# Patient Record
Sex: FEMALE | Race: WHITE | NOT HISPANIC OR LATINO | Employment: FULL TIME | URBAN - NONMETROPOLITAN AREA
[De-identification: names, ages, dates, MRNs, and addresses within clinical notes are randomized per-mention and may not be internally consistent; named-entity substitution may affect disease eponyms.]

---

## 2019-10-09 ENCOUNTER — OFFICE VISIT (OUTPATIENT)
Dept: OBSTETRICS AND GYNECOLOGY | Facility: CLINIC | Age: 39
End: 2019-10-09

## 2019-10-09 VITALS
DIASTOLIC BLOOD PRESSURE: 76 MMHG | WEIGHT: 136 LBS | HEIGHT: 66 IN | BODY MASS INDEX: 21.86 KG/M2 | SYSTOLIC BLOOD PRESSURE: 118 MMHG

## 2019-10-09 DIAGNOSIS — N94.10 FEMALE DYSPAREUNIA: ICD-10-CM

## 2019-10-09 DIAGNOSIS — R10.2 PELVIC PAIN: ICD-10-CM

## 2019-10-09 DIAGNOSIS — R53.83 FATIGUE, UNSPECIFIED TYPE: ICD-10-CM

## 2019-10-09 DIAGNOSIS — Z01.419 WELL WOMAN EXAM WITH ROUTINE GYNECOLOGICAL EXAM: Primary | ICD-10-CM

## 2019-10-09 DIAGNOSIS — Z12.39 ENCOUNTER FOR SCREENING FOR MALIGNANT NEOPLASM OF BREAST: ICD-10-CM

## 2019-10-09 DIAGNOSIS — R61 NIGHT SWEATS: ICD-10-CM

## 2019-10-09 PROCEDURE — 99385 PREV VISIT NEW AGE 18-39: CPT | Performed by: NURSE PRACTITIONER

## 2019-10-09 RX ORDER — CETIRIZINE HCL/PSEUDOEPHEDRINE 5 MG-120MG
TABLET, EXTENDED RELEASE 12 HR ORAL
Refills: 1 | COMMUNITY
Start: 2019-08-31 | End: 2022-07-29

## 2019-10-09 RX ORDER — MONTELUKAST SODIUM 10 MG/1
10 TABLET ORAL EVERY EVENING
Refills: 0 | COMMUNITY
Start: 2019-08-05 | End: 2022-07-29

## 2019-10-09 RX ORDER — ALBUTEROL SULFATE 90 UG/1
2 AEROSOL, METERED RESPIRATORY (INHALATION) EVERY 4 HOURS PRN
COMMUNITY

## 2019-10-09 RX ORDER — CETIRIZINE HYDROCHLORIDE 10 MG/1
10 TABLET ORAL EVERY EVENING
Refills: 1 | COMMUNITY
Start: 2019-08-08

## 2019-10-09 NOTE — PROGRESS NOTES
"    Subjective   Bree Garrett is a 39 y.o. female  YOB: 1980        Chief Complaint   Patient presents with   • Gynecologic Exam     New patient here today for yearly exam. Last exam was done in 2015, pap was normal. Patient has had a TLH w/ bilateral salpingectomy due to Adenomyosis. Patient has c/o pain after intercourse in her lower back near her tailbone. Patient states the pain wraps around to her stomach. Patient also states she feels like \"her hormones are off\". Patietn c/o night sweats, acne and vaginal dryness.        Gynecologic Exam   The patient's primary symptoms include pelvic pain. Pertinent negatives include no abdominal pain, back pain, constipation, diarrhea, dysuria, fever, frequency, hematuria, nausea, rash, sore throat, urgency or vomiting.       The following portions of the patient's history were reviewed and updated as appropriate: allergies, current medications, past family history, past medical history, past social history, past surgical history and problem list.    No Known Allergies    Past Medical History:   Diagnosis Date   • Allergies    • Asthma    • Endometriosis        Family History   Problem Relation Age of Onset   • Breast cancer Neg Hx    • Ovarian cancer Neg Hx    • Colon cancer Neg Hx        Social History     Socioeconomic History   • Marital status:      Spouse name: Not on file   • Number of children: Not on file   • Years of education: Not on file   • Highest education level: Not on file   Tobacco Use   • Smoking status: Never Smoker   • Smokeless tobacco: Never Used   Substance and Sexual Activity   • Alcohol use: Defer   • Drug use: No   • Sexual activity: Yes     Birth control/protection: Surgical         Current Outpatient Medications:   •  albuterol sulfate  (90 Base) MCG/ACT inhaler, Inhale 2 puffs Every 4 (Four) Hours As Needed for Wheezing., Disp: , Rfl:   •  cetirizine (zyrTEC) 10 MG tablet, Take 10 mg by mouth Every Evening., Disp: " , Rfl: 1  •  montelukast (SINGULAIR) 10 MG tablet, Take 10 mg by mouth Every Evening., Disp: , Rfl: 0  •  ZYRTEC-D ALLERGY & CONGESTION 5-120 MG per 12 hr tablet, TAKE 1 TABLET ONCE A DAY IN THE AM ORALLY, Disp: , Rfl: 1    No LMP recorded. Patient has had a hysterectomy.    Sexual History:         Could not be calculated    Past Surgical History:   Procedure Laterality Date   • BREAST AUGMENTATION     • DILATATION AND CURETTAGE     • ENDOMETRIAL ABLATION      x 2   • ENDOSCOPY     • HYSTERECTOMY  12/07/2015    [facility] w/ bilateral salpingectomy due to Adenomyosis.    • SALPINGECTOMY Bilateral        Review of Systems   Constitutional: Negative for activity change, appetite change, fatigue, fever, unexpected weight gain and unexpected weight loss.   HENT: Negative for congestion, ear pain, hearing loss, nosebleeds, rhinorrhea, sore throat, tinnitus and trouble swallowing.    Eyes: Negative for blurred vision, pain, discharge, itching and visual disturbance.   Respiratory: Negative for apnea, chest tightness, shortness of breath and wheezing.    Cardiovascular: Negative for chest pain and leg swelling.   Gastrointestinal: Negative for abdominal pain, blood in stool, constipation, diarrhea, nausea, vomiting and GERD.   Endocrine: Negative for heat intolerance, polydipsia and polyuria.   Genitourinary: Positive for dyspareunia and pelvic pain. Negative for breast lump, decreased libido, difficulty urinating, dysuria, frequency, genital sores, hematuria, menstrual problem, urgency, urinary incontinence and vaginal pain.   Musculoskeletal: Negative for arthralgias, back pain, joint swelling and myalgias.   Skin: Negative for color change, rash and skin lesions.   Allergic/Immunologic: Negative for environmental allergies, food allergies and immunocompromised state.   Neurological: Negative for dizziness, tremors, seizures, syncope, facial asymmetry, numbness and headache.   Hematological: Negative for adenopathy. Does not  bruise/bleed easily.   Psychiatric/Behavioral: Negative for agitation, hallucinations, sleep disturbance, suicidal ideas and depressed mood. The patient is not nervous/anxious.        Objective   Physical Exam   Constitutional: She is oriented to person, place, and time. She appears well-developed and well-nourished. No distress.   HENT:   Head: Normocephalic.   Right Ear: External ear normal.   Left Ear: External ear normal.   Nose: Nose normal.   Mouth/Throat: Oropharynx is clear and moist.   Eyes: Conjunctivae are normal. Right eye exhibits no discharge. Left eye exhibits no discharge. No scleral icterus.   Neck: Normal range of motion. Neck supple. Carotid bruit is not present. No tracheal deviation present. No thyromegaly present.   Cardiovascular: Normal rate, regular rhythm, normal heart sounds and intact distal pulses.   No murmur heard.  Pulmonary/Chest: Effort normal and breath sounds normal. No respiratory distress. She has no wheezes. Right breast exhibits no inverted nipple, no mass, no nipple discharge, no skin change and no tenderness. Left breast exhibits no inverted nipple, no mass, no nipple discharge, no skin change and no tenderness. Breasts are symmetrical. There is no breast swelling.   Abdominal: Soft. She exhibits no distension and no mass. There is no tenderness. There is no guarding. No hernia. Hernia confirmed negative in the right inguinal area and confirmed negative in the left inguinal area.   Genitourinary: Rectum normal, vagina normal and uterus normal. Rectal exam shows no mass. No breast tenderness, discharge or bleeding. Pelvic exam was performed with patient supine. There is no rash, tenderness, lesion or injury on the right labia. There is no rash, tenderness, lesion or injury on the left labia. Uterus is not enlarged, not fixed and not tender. Cervix exhibits no motion tenderness, no discharge and no friability. Right adnexum displays no mass, no tenderness and no fullness. Left  "adnexum displays no mass, no tenderness and no fullness. No erythema, tenderness or bleeding in the vagina. No foreign body in the vagina. No signs of injury around the vagina. No vaginal discharge found.   Genitourinary Comments:   BSU normal  Urethral meatus  Normal  Perineum  Normal   Musculoskeletal: Normal range of motion. She exhibits no edema or tenderness.   Lymphadenopathy:        Head (right side): No submental, no submandibular, no tonsillar, no preauricular, no posterior auricular and no occipital adenopathy present.        Head (left side): No submental, no submandibular, no tonsillar, no preauricular, no posterior auricular and no occipital adenopathy present.     She has no cervical adenopathy.        Right cervical: No superficial cervical, no deep cervical and no posterior cervical adenopathy present.       Left cervical: No superficial cervical, no deep cervical and no posterior cervical adenopathy present.     She has no axillary adenopathy.        Right: No inguinal adenopathy present.        Left: No inguinal adenopathy present.   Neurological: She is alert and oriented to person, place, and time. Coordination normal.   Skin: Skin is warm and dry. No bruising and no rash noted. She is not diaphoretic. No erythema.   Psychiatric: She has a normal mood and affect. Her behavior is normal. Judgment and thought content normal.   Nursing note and vitals reviewed.        Vitals:    10/09/19 1108   BP: 118/76   Weight: 61.7 kg (136 lb)   Height: 167.6 cm (66\")       Bree was seen today for gynecologic exam.    Diagnoses and all orders for this visit:    Well woman exam with routine gynecological exam  Comments:  Well woman exam.  ThinPrep Pap smear done.  Orders:  -     Vitamin D 25 Hydroxy  -     T4, Free  -     Hemoglobin A1c  -     CBC & Differential  -     Comprehensive Metabolic Panel  -     Lipid Panel With LDL / HDL Ratio  -     TSH    Encounter for screening for malignant neoplasm of " breast  Comments:  Baseline mammogram ordered.  Orders:  -     Mammo Screening Digital Tomosynthesis Bilateral With CAD; Future    Female dyspareunia  Comments:  Patient reports pain after intercourse in her low back and near her tailbone that wraps around to her stomach.    Fatigue, unspecified type  Comments:  Yearly lab panel done -pending results.  Orders:  -     Vitamin D 25 Hydroxy  -     T4, Free  -     Hemoglobin A1c  -     CBC & Differential  -     Comprehensive Metabolic Panel  -     Lipid Panel With LDL / HDL Ratio  -     TSH  -     Follicle Stimulating Hormone  -     Luteinizing Hormone  -     Testosterone  -     DHEA-Sulfate    Night sweats  Comments:  Patient reports frequent night sweats.  Lab work done [id number] pending results.  Orders:  -     Follicle Stimulating Hormone  -     Luteinizing Hormone  -     Testosterone  -     DHEA-Sulfate    Pelvic pain  Comments:  Patient reports pain after intercourse and generalized pelvic pain.  Bimanual exam unremarkable.  Pelvic ultrasound ordered.  History of endometriosis.          Patient's Body mass index is 21.95 kg/m². BMI is within normal parameters. No follow-up required..             Non-Smoker    MyChart Instructions Given

## 2019-10-10 LAB
25(OH)D3+25(OH)D2 SERPL-MCNC: 33.2 NG/ML (ref 30–100)
ALBUMIN SERPL-MCNC: 4.8 G/DL (ref 3.5–5.2)
ALBUMIN/GLOB SERPL: 1.9 G/DL
ALP SERPL-CCNC: 60 U/L (ref 39–117)
ALT SERPL-CCNC: 12 U/L (ref 1–33)
AST SERPL-CCNC: 17 U/L (ref 1–32)
BASOPHILS # BLD AUTO: 0.03 10*3/MM3 (ref 0–0.2)
BASOPHILS NFR BLD AUTO: 0.4 % (ref 0–1.5)
BILIRUB SERPL-MCNC: 0.4 MG/DL (ref 0.2–1.2)
BUN SERPL-MCNC: 11 MG/DL (ref 6–20)
BUN/CREAT SERPL: 16.9 (ref 7–25)
CALCIUM SERPL-MCNC: 8.3 MG/DL (ref 8.6–10.5)
CHLORIDE SERPL-SCNC: 98 MMOL/L (ref 98–107)
CHOLEST SERPL-MCNC: 179 MG/DL (ref 0–200)
CO2 SERPL-SCNC: 26.4 MMOL/L (ref 22–29)
CREAT SERPL-MCNC: 0.65 MG/DL (ref 0.57–1)
DHEA-S SERPL-MCNC: 142.9 UG/DL (ref 57.3–279.2)
EOSINOPHIL # BLD AUTO: 0.09 10*3/MM3 (ref 0–0.4)
EOSINOPHIL NFR BLD AUTO: 1.3 % (ref 0.3–6.2)
ERYTHROCYTE [DISTWIDTH] IN BLOOD BY AUTOMATED COUNT: 12.2 % (ref 12.3–15.4)
FSH SERPL-ACNC: 4.5 MIU/ML
GLOBULIN SER CALC-MCNC: 2.5 GM/DL
GLUCOSE SERPL-MCNC: 84 MG/DL (ref 65–99)
HBA1C MFR BLD: 5.1 % (ref 4.8–5.6)
HCT VFR BLD AUTO: 40.6 % (ref 34–46.6)
HDLC SERPL-MCNC: 71 MG/DL (ref 40–60)
HGB BLD-MCNC: 13.7 G/DL (ref 12–15.9)
IMM GRANULOCYTES # BLD AUTO: 0.01 10*3/MM3 (ref 0–0.05)
IMM GRANULOCYTES NFR BLD AUTO: 0.1 % (ref 0–0.5)
LDLC SERPL CALC-MCNC: 92 MG/DL (ref 0–100)
LDLC/HDLC SERPL: 1.3 {RATIO}
LH SERPL-ACNC: 5.4 MIU/ML
LYMPHOCYTES # BLD AUTO: 1.38 10*3/MM3 (ref 0.7–3.1)
LYMPHOCYTES NFR BLD AUTO: 19.6 % (ref 19.6–45.3)
MCH RBC QN AUTO: 33.3 PG (ref 26.6–33)
MCHC RBC AUTO-ENTMCNC: 33.7 G/DL (ref 31.5–35.7)
MCV RBC AUTO: 98.8 FL (ref 79–97)
MONOCYTES # BLD AUTO: 0.67 10*3/MM3 (ref 0.1–0.9)
MONOCYTES NFR BLD AUTO: 9.5 % (ref 5–12)
NEUTROPHILS # BLD AUTO: 4.86 10*3/MM3 (ref 1.7–7)
NEUTROPHILS NFR BLD AUTO: 69.1 % (ref 42.7–76)
NRBC BLD AUTO-RTO: 0 /100 WBC (ref 0–0.2)
PLATELET # BLD AUTO: 290 10*3/MM3 (ref 140–450)
POTASSIUM SERPL-SCNC: 4.2 MMOL/L (ref 3.5–5.2)
PROT SERPL-MCNC: 7.3 G/DL (ref 6–8.5)
RBC # BLD AUTO: 4.11 10*6/MM3 (ref 3.77–5.28)
SODIUM SERPL-SCNC: 139 MMOL/L (ref 136–145)
T4 FREE SERPL-MCNC: 1.32 NG/DL (ref 0.93–1.7)
TESTOST SERPL-MCNC: 13 NG/DL (ref 8–48)
TRIGL SERPL-MCNC: 79 MG/DL (ref 0–150)
TSH SERPL DL<=0.005 MIU/L-ACNC: 1.34 UIU/ML (ref 0.27–4.2)
VLDLC SERPL CALC-MCNC: 15.8 MG/DL
WBC # BLD AUTO: 7.04 10*3/MM3 (ref 3.4–10.8)

## 2019-11-05 ENCOUNTER — OFFICE VISIT (OUTPATIENT)
Dept: OBSTETRICS AND GYNECOLOGY | Facility: CLINIC | Age: 39
End: 2019-11-05

## 2019-11-05 ENCOUNTER — PROCEDURE VISIT (OUTPATIENT)
Dept: OBSTETRICS AND GYNECOLOGY | Facility: CLINIC | Age: 39
End: 2019-11-05

## 2019-11-05 VITALS
HEIGHT: 66 IN | DIASTOLIC BLOOD PRESSURE: 66 MMHG | BODY MASS INDEX: 22.18 KG/M2 | SYSTOLIC BLOOD PRESSURE: 122 MMHG | WEIGHT: 138 LBS

## 2019-11-05 DIAGNOSIS — R10.2 PELVIC PAIN: Primary | ICD-10-CM

## 2019-11-05 DIAGNOSIS — N94.10 DYSPAREUNIA IN FEMALE: ICD-10-CM

## 2019-11-05 PROCEDURE — 76830 TRANSVAGINAL US NON-OB: CPT | Performed by: OBSTETRICS & GYNECOLOGY

## 2019-11-05 PROCEDURE — 99213 OFFICE O/P EST LOW 20 MIN: CPT | Performed by: NURSE PRACTITIONER

## 2019-11-05 NOTE — PROGRESS NOTES
"Subjective     Bree Garrett is a 39 y.o. female    Patient is here today for follow-up of pelvic ultrasound for pelvic pain and pain with intercourse.      Pelvic Pain   The patient's primary symptoms include pelvic pain. The patient's pertinent negatives include no vaginal bleeding or vaginal discharge. The pain is mild. The problem affects the left side. She is not pregnant. Associated symptoms include painful intercourse. Pertinent negatives include no abdominal pain, anorexia, back pain, chills, constipation, diarrhea, discolored urine, dysuria, fever, flank pain, frequency, headaches, hematuria, joint pain, joint swelling, nausea, rash, sore throat, urgency or vomiting. The symptoms are aggravated by intercourse. She has tried nothing for the symptoms. She is sexually active. She uses hysterectomy for contraception.         /66   Ht 167.6 cm (66\")   Wt 62.6 kg (138 lb)   Breastfeeding? No   BMI 22.27 kg/m²     Outpatient Encounter Medications as of 11/5/2019   Medication Sig Dispense Refill   • albuterol sulfate  (90 Base) MCG/ACT inhaler Inhale 2 puffs Every 4 (Four) Hours As Needed for Wheezing.     • cetirizine (zyrTEC) 10 MG tablet Take 10 mg by mouth Every Evening.  1   • montelukast (SINGULAIR) 10 MG tablet Take 10 mg by mouth Every Evening.  0   • ZYRTEC-D ALLERGY & CONGESTION 5-120 MG per 12 hr tablet TAKE 1 TABLET ONCE A DAY IN THE AM ORALLY  1     No facility-administered encounter medications on file as of 11/5/2019.        Surgical History  Past Surgical History:   Procedure Laterality Date   • BREAST AUGMENTATION     • DILATATION AND CURETTAGE     • ENDOMETRIAL ABLATION      x 2   • ENDOSCOPY     • HYSTERECTOMY  12/07/2015    [facility] w/ bilateral salpingectomy due to Adenomyosis.    • SALPINGECTOMY Bilateral        Family History  Family History   Problem Relation Age of Onset   • Heart disease Father    • Lung cancer Mother    • No Known Problems Brother    • Heart disease Paternal " Grandmother    • Heart disease Maternal Grandmother    • Breast cancer Neg Hx    • Ovarian cancer Neg Hx    • Colon cancer Neg Hx        The following portions of the patient's history were reviewed and updated as appropriate: allergies, current medications, past family history, past medical history, past social history, past surgical history and problem list.    Review of Systems   Constitutional: Negative for activity change, appetite change, chills, diaphoresis, fatigue, fever, unexpected weight gain and unexpected weight loss.   HENT: Negative for congestion, dental problem, drooling, ear discharge, ear pain, facial swelling, hearing loss, mouth sores, nosebleeds, postnasal drip, rhinorrhea, sinus pressure, sneezing, sore throat, swollen glands, tinnitus, trouble swallowing and voice change.    Eyes: Negative for blurred vision, double vision, photophobia, pain, discharge, redness, itching and visual disturbance.   Respiratory: Negative for apnea, cough, choking, chest tightness, shortness of breath, wheezing and stridor.    Cardiovascular: Negative for chest pain, palpitations and leg swelling.   Gastrointestinal: Negative for abdominal distention, abdominal pain, anal bleeding, anorexia, blood in stool, constipation, diarrhea, nausea, rectal pain, vomiting, GERD and indigestion.   Endocrine: Negative for cold intolerance, heat intolerance, polydipsia, polyphagia and polyuria.   Genitourinary: Positive for dyspareunia and pelvic pain. Negative for amenorrhea, breast discharge, breast lump, breast pain, decreased libido, decreased urine volume, difficulty urinating, dysuria, flank pain, frequency, genital sores, hematuria, menstrual problem, pelvic pressure, urgency, urinary incontinence, vaginal bleeding, vaginal discharge and vaginal pain.   Musculoskeletal: Negative for arthralgias, back pain, gait problem, joint pain, joint swelling, myalgias, neck pain, neck stiffness and bursitis.   Skin: Negative for  color change, dry skin and rash.   Allergic/Immunologic: Negative for environmental allergies, food allergies and immunocompromised state.   Neurological: Negative for dizziness, tremors, seizures, syncope, facial asymmetry, speech difficulty, weakness, light-headedness, numbness, headache, memory problem and confusion.   Hematological: Negative for adenopathy. Does not bruise/bleed easily.   Psychiatric/Behavioral: Negative for agitation, behavioral problems, decreased concentration, dysphoric mood, hallucinations, self-injury, sleep disturbance, suicidal ideas, negative for hyperactivity, depressed mood and stress. The patient is not nervous/anxious.        Objective   Physical Exam   Constitutional: She is oriented to person, place, and time. She appears well-developed and well-nourished.   HENT:   Head: Normocephalic and atraumatic.   Eyes: Conjunctivae are normal. Right eye exhibits no discharge. Left eye exhibits no discharge.   Neck: Normal range of motion. Neck supple. No thyromegaly present.   Cardiovascular: Normal rate, regular rhythm and normal heart sounds.   Pulmonary/Chest: Effort normal and breath sounds normal.   Neurological: She is alert and oriented to person, place, and time.   Skin: Skin is warm and dry.   Psychiatric: She has a normal mood and affect. Her behavior is normal. Judgment and thought content normal.   Nursing note and vitals reviewed.      Assessment/Plan   Bree was seen today for pelvic pain.    Diagnoses and all orders for this visit:    Pelvic pain  Comments:  She has had some left lower quadrant pain radiating around to her back but only at times.  Pelvic ultrasound today shows a very small hemorrhagic follicle.  It appears to be resolving.    Dyspareunia in female  Comments:  Pain with intercourse does not occur every time.  She is offered physical therapy for her pelvic floor and will call if she so desires.  She denies any vaginal discharge or bleeding.         Patient's  Body mass index is 22.27 kg/m². BMI is within normal parameters. No follow-up required..      Janae Angeles, APRN  11/5/2019

## 2019-11-05 NOTE — PATIENT INSTRUCTIONS

## 2020-02-11 ENCOUNTER — TRANSCRIBE ORDERS (OUTPATIENT)
Dept: ADMINISTRATIVE | Facility: HOSPITAL | Age: 40
End: 2020-02-11

## 2020-02-11 DIAGNOSIS — Z12.31 OTHER SCREENING MAMMOGRAM: Primary | ICD-10-CM

## 2020-02-21 ENCOUNTER — HOSPITAL ENCOUNTER (OUTPATIENT)
Dept: MAMMOGRAPHY | Facility: HOSPITAL | Age: 40
Discharge: HOME | End: 2020-02-21
Admitting: FAMILY MEDICINE

## 2020-02-21 DIAGNOSIS — Z12.31 OTHER SCREENING MAMMOGRAM: ICD-10-CM

## 2020-02-21 PROCEDURE — 77063 BREAST TOMOSYNTHESIS BI: CPT

## 2020-02-21 PROCEDURE — 77067 SCR MAMMO BI INCL CAD: CPT

## 2022-07-25 ENCOUNTER — OUTSIDE FACILITY SERVICE (OUTPATIENT)
Dept: CARDIOLOGY | Facility: CLINIC | Age: 42
End: 2022-07-25

## 2022-07-25 DIAGNOSIS — R00.1 BRADYCARDIA: ICD-10-CM

## 2022-07-25 DIAGNOSIS — R07.9 CHEST PAIN, UNSPECIFIED TYPE: ICD-10-CM

## 2022-07-25 PROCEDURE — 93000 ELECTROCARDIOGRAM COMPLETE: CPT | Performed by: INTERNAL MEDICINE

## 2022-07-27 ENCOUNTER — TRANSCRIBE ORDERS (OUTPATIENT)
Dept: CARDIOLOGY | Facility: CLINIC | Age: 42
End: 2022-07-27

## 2022-07-27 DIAGNOSIS — R00.1 BRADYCARDIA: Primary | ICD-10-CM

## 2022-07-27 DIAGNOSIS — R07.9 CHEST PAIN, UNSPECIFIED TYPE: ICD-10-CM

## 2022-07-29 ENCOUNTER — OFFICE VISIT (OUTPATIENT)
Dept: CARDIOLOGY | Facility: CLINIC | Age: 42
End: 2022-07-29

## 2022-07-29 VITALS
BODY MASS INDEX: 22.5 KG/M2 | HEIGHT: 66 IN | WEIGHT: 140 LBS | SYSTOLIC BLOOD PRESSURE: 126 MMHG | HEART RATE: 52 BPM | OXYGEN SATURATION: 100 % | DIASTOLIC BLOOD PRESSURE: 84 MMHG

## 2022-07-29 DIAGNOSIS — R42 DIZZINESS: ICD-10-CM

## 2022-07-29 DIAGNOSIS — R00.1 BRADYCARDIA, SINUS: ICD-10-CM

## 2022-07-29 DIAGNOSIS — I15.9 SECONDARY HYPERTENSION: Primary | ICD-10-CM

## 2022-07-29 PROBLEM — I10 ESSENTIAL HYPERTENSION: Status: ACTIVE | Noted: 2022-07-29

## 2022-07-29 PROCEDURE — 99204 OFFICE O/P NEW MOD 45 MIN: CPT | Performed by: INTERNAL MEDICINE

## 2022-07-29 RX ORDER — LISINOPRIL 10 MG/1
1 TABLET ORAL NIGHTLY
COMMUNITY

## 2022-07-29 NOTE — PROGRESS NOTES
Bree Garrett  42 y.o. female    07/29/2022  1. Secondary hypertension    2. Bradycardia, sinus    3. Dizziness        History of Present Illness:  42 years old patient physically very active well-educated well behaved who had history of fluctuating blood pressures managed with lisinopril and having off-and-on history of postural dizziness for more than 1 year never blacked out.  He is had preceding symptoms of some nauseous feeling generalized weakness fatigue.  She was evaluated at [facility] with a stress test and echo reported normal.  She was recently placed on monitor given some bradycardia for 3 days.  The monitor is still normal.  No orthopnea PND fever cough or chills.  She also had palpitation EKG yesterday heart rate was 70 bpm today heart rate 52 she is a physically active        SUBJECTIVE:    Allergies   Allergen Reactions   • Cat Hair Extract Unknown - Low Severity   • Grass Provider Review Needed         Past Medical History:   Diagnosis Date   • Allergies    • Asthma    • Endometriosis          Past Surgical History:   Procedure Laterality Date   • BREAST AUGMENTATION     • DILATATION AND CURETTAGE     • ENDOMETRIAL ABLATION      x 2   • ENDOSCOPY     • HYSTERECTOMY  12/07/2015    [facility] w/ bilateral salpingectomy due to Adenomyosis.    • SALPINGECTOMY Bilateral          Family History   Problem Relation Age of Onset   • Heart disease Father    • Lung cancer Mother    • No Known Problems Brother    • Heart disease Paternal Grandmother    • Heart disease Maternal Grandmother    • Breast cancer Neg Hx    • Ovarian cancer Neg Hx    • Colon cancer Neg Hx          Social History     Socioeconomic History   • Marital status:    Tobacco Use   • Smoking status: Never Smoker   • Smokeless tobacco: Never Used   Substance and Sexual Activity   • Alcohol use: Yes   • Drug use: No   • Sexual activity: Yes     Birth control/protection: Surgical         Current Outpatient Medications   Medication Sig  "Dispense Refill   • albuterol sulfate  (90 Base) MCG/ACT inhaler Inhale 2 puffs Every 4 (Four) Hours As Needed for Wheezing.     • cetirizine (zyrTEC) 10 MG tablet Take 10 mg by mouth Every Evening.  1   • lisinopril (PRINIVIL,ZESTRIL) 10 MG tablet Take 1 tablet by mouth Daily.       No current facility-administered medications for this visit.           Review of Systems:       Constitutional:  Denies recent weight loss, weight gain,no change in exercise tolerance.     HENT:  Denies any hearing loss, epistaxis    Eyes: No blurring    Respiratory: Seasonal allergy    Cardiovascular: See H&P    Gastrointestinal:  Denies change in bowel habits and dyspepsia    Endocrine: Negative for cold intolerance, heat intolerance, polydipsia    Genitourinary: Negative.      Musculoskeletal: History of osteoarthritis    Skin:  Deniesrashes, or skin lesions.     Allergic/Immunologic: Negative.  No history of urticaria    Neurological:  Denies any history of recurrent headaches, strokes,     Hematological: Denies any food allergies, seasonal allergies    Psychiatric/Behavioral: Denies any history of depression        OBJECTIVE:    /84 (BP Location: Right arm, Patient Position: Sitting, Cuff Size: Adult)   Pulse 52   Ht 167.6 cm (66\")   Wt 63.5 kg (140 lb)   SpO2 100%   BMI 22.60 kg/m²     Physical Exam:       Constitutional: Cooperative, alert and oriented, well-developed, well-nourished, in no acute distress.     HENT:   Head: Normocephalic, conjunctive is a pink, thyroid is nonpalpable no carotid bruit and trachea central.     Cardiovascular: Regular rhythm, S1 and S2 normal, no S3 or S4. Apical impulse not displaced. No murmurs    Pulmonary/Chest: Chest: No chest wall tenderness no rales and wheezing    Abdominal: Abdomen soft, bowel sounds normoactive, no masses,    Musculoskeletal: No deformities, clubbing, cyanosis, erythema. positive mild edema  Neurological: No gross motor or sensory deficits noted    Skin: " Warm and dry to the touch, no apparent skin lesions .     Psychiatric: He has a normal mood and affect. His behavior is normal        Procedures      Lab Results   Component Value Date    WBC 7.04 10/09/2019    HGB 13.7 10/09/2019    HCT 40.6 10/09/2019    MCV 98.8 (H) 10/09/2019     10/09/2019     Lab Results   Component Value Date    GLUCOSE 84 10/09/2019    BUN 11 10/09/2019    CREATININE 0.65 10/09/2019    EGFRIFNONA 101 10/09/2019    EGFRIFAFRI 123 10/09/2019    BCR 16.9 10/09/2019    CO2 26.4 10/09/2019    CALCIUM 8.3 (L) 10/09/2019    PROTENTOTREF 7.3 10/09/2019    ALBUMIN 4.80 10/09/2019    LABIL2 1.9 10/09/2019    AST 17 10/09/2019    ALT 12 10/09/2019     No results found for: CHOL  Lab Results   Component Value Date    TRIG 79 10/09/2019     Lab Results   Component Value Date    HDL 71 (H) 10/09/2019     No components found for: LDLCALC  Lab Results   Component Value Date    LDL 92 10/09/2019     No results found for: HDLLDLRATIO  No components found for: CHOLHDL  Lab Results   Component Value Date    HGBA1C 5.10 10/09/2019     Lab Results   Component Value Date    TSH 1.340 10/09/2019    THYROIDAB 16 04/03/2014           ASSESSMENT AND PLAN:  Postural dizziness    Patient has longstanding history of more than 1 year of near syncope never blacked out from clinical description is most likely mechanism history of vasovagal.  This is going on for most close to 1 year or so.  Patient encouraged to increase her water intake some exercise addressed with the patient was told of  #2 sinus bradycardia    Patient during possible dizziness she did have heart rates dropping to 50s her late 40s.  From clinical description again possibility of underlying vasovagal cannot be excluded as these things are happening in upright posture  Patient currently has monitor placed for 3 days we will wait for the monitor reports and will get the echo and stress test report from [facility]  And based on the monitor we  will proceed either ambulatory blood pressure monitoring or head up tilt test  3 hypertension    Patient fluctuating systolic blood pressure currently she is on 10 mg patient was counseled educated to take lisinopril 10 mg if systolic blood pressure is more than 140 and if it is 110-1 20 take half of the tablet.    I spent 35 minutes caring for Bree on this date of service. This time includes time spent by me of counseling/coordination of care as relates to the presenting problem and any ordered procedures/tests as outlined above.         This document has been electronically signed by Hilario Nicolas MD on July 29, 2022 12:31 CDT      Diagnoses and all orders for this visit:    1. Secondary hypertension (Primary)  -     ECG 12 Lead    2. Bradycardia, sinus    3. Dizziness          Hilario Nicolas MD  7/29/2022  11:58 CDT

## 2022-08-02 ENCOUNTER — HOSPITAL ENCOUNTER (EMERGENCY)
Facility: HOSPITAL | Age: 42
Discharge: HOME | End: 2022-08-02
Attending: STUDENT IN AN ORGANIZED HEALTH CARE EDUCATION/TRAINING PROGRAM | Admitting: STUDENT IN AN ORGANIZED HEALTH CARE EDUCATION/TRAINING PROGRAM

## 2022-08-02 ENCOUNTER — APPOINTMENT (OUTPATIENT)
Dept: GENERAL RADIOLOGY | Facility: HOSPITAL | Age: 42
End: 2022-08-02

## 2022-08-02 VITALS
HEIGHT: 66 IN | RESPIRATION RATE: 18 BRPM | BODY MASS INDEX: 23.83 KG/M2 | SYSTOLIC BLOOD PRESSURE: 145 MMHG | TEMPERATURE: 97.9 F | HEART RATE: 70 BPM | WEIGHT: 148.3 LBS | DIASTOLIC BLOOD PRESSURE: 71 MMHG | OXYGEN SATURATION: 99 %

## 2022-08-02 DIAGNOSIS — R00.2 PALPITATIONS: Primary | ICD-10-CM

## 2022-08-02 LAB
ALBUMIN SERPL-MCNC: 4.4 G/DL (ref 3.5–5.2)
ALBUMIN/GLOB SERPL: 1.5 G/DL
ALP SERPL-CCNC: 65 U/L (ref 39–117)
ALT SERPL W P-5'-P-CCNC: 14 U/L (ref 1–33)
AMPHET+METHAMPHET UR QL: NEGATIVE
AMPHETAMINES UR QL: NEGATIVE
ANION GAP SERPL CALCULATED.3IONS-SCNC: 10 MMOL/L (ref 5–15)
AST SERPL-CCNC: 20 U/L (ref 1–32)
BARBITURATES UR QL SCN: NEGATIVE
BASOPHILS # BLD AUTO: 0.03 10*3/MM3 (ref 0–0.2)
BASOPHILS NFR BLD AUTO: 0.4 % (ref 0–1.5)
BENZODIAZ UR QL SCN: NEGATIVE
BILIRUB SERPL-MCNC: 0.3 MG/DL (ref 0–1.2)
BUN SERPL-MCNC: 14 MG/DL (ref 6–20)
BUN/CREAT SERPL: 18.7 (ref 7–25)
BUPRENORPHINE SERPL-MCNC: NEGATIVE NG/ML
CALCIUM SPEC-SCNC: 9.1 MG/DL (ref 8.6–10.5)
CANNABINOIDS SERPL QL: NEGATIVE
CHLORIDE SERPL-SCNC: 104 MMOL/L (ref 98–107)
CO2 SERPL-SCNC: 26 MMOL/L (ref 22–29)
COCAINE UR QL: NEGATIVE
CREAT SERPL-MCNC: 0.75 MG/DL (ref 0.57–1)
DEPRECATED RDW RBC AUTO: 40.8 FL (ref 37–54)
EGFRCR SERPLBLD CKD-EPI 2021: 102.1 ML/MIN/1.73
EOSINOPHIL # BLD AUTO: 0.13 10*3/MM3 (ref 0–0.4)
EOSINOPHIL NFR BLD AUTO: 1.9 % (ref 0.3–6.2)
ERYTHROCYTE [DISTWIDTH] IN BLOOD BY AUTOMATED COUNT: 11.9 % (ref 12.3–15.4)
FLUAV RNA RESP QL NAA+PROBE: NOT DETECTED
FLUBV RNA RESP QL NAA+PROBE: NOT DETECTED
GLOBULIN UR ELPH-MCNC: 2.9 GM/DL
GLUCOSE SERPL-MCNC: 93 MG/DL (ref 65–99)
HCT VFR BLD AUTO: 35.7 % (ref 34–46.6)
HGB BLD-MCNC: 12.6 G/DL (ref 12–15.9)
HOLD SPECIMEN: NORMAL
HOLD SPECIMEN: NORMAL
IMM GRANULOCYTES # BLD AUTO: 0.02 10*3/MM3 (ref 0–0.05)
IMM GRANULOCYTES NFR BLD AUTO: 0.3 % (ref 0–0.5)
LYMPHOCYTES # BLD AUTO: 1.55 10*3/MM3 (ref 0.7–3.1)
LYMPHOCYTES NFR BLD AUTO: 22.2 % (ref 19.6–45.3)
MAGNESIUM SERPL-MCNC: 2.5 MG/DL (ref 1.6–2.6)
MCH RBC QN AUTO: 33.4 PG (ref 26.6–33)
MCHC RBC AUTO-ENTMCNC: 35.3 G/DL (ref 31.5–35.7)
MCV RBC AUTO: 94.7 FL (ref 79–97)
METHADONE UR QL SCN: NEGATIVE
MONOCYTES # BLD AUTO: 0.65 10*3/MM3 (ref 0.1–0.9)
MONOCYTES NFR BLD AUTO: 9.3 % (ref 5–12)
NEUTROPHILS NFR BLD AUTO: 4.61 10*3/MM3 (ref 1.7–7)
NEUTROPHILS NFR BLD AUTO: 65.9 % (ref 42.7–76)
NRBC BLD AUTO-RTO: 0 /100 WBC (ref 0–0.2)
OPIATES UR QL: NEGATIVE
OXYCODONE UR QL SCN: NEGATIVE
PCP UR QL SCN: NEGATIVE
PLATELET # BLD AUTO: 265 10*3/MM3 (ref 140–450)
PMV BLD AUTO: 9.3 FL (ref 6–12)
POTASSIUM SERPL-SCNC: 3.7 MMOL/L (ref 3.5–5.2)
PROPOXYPH UR QL: NEGATIVE
PROT SERPL-MCNC: 7.3 G/DL (ref 6–8.5)
RBC # BLD AUTO: 3.77 10*6/MM3 (ref 3.77–5.28)
SARS-COV-2 RNA RESP QL NAA+PROBE: NOT DETECTED
SODIUM SERPL-SCNC: 140 MMOL/L (ref 136–145)
T4 FREE SERPL-MCNC: 1.25 NG/DL (ref 0.93–1.7)
TRICYCLICS UR QL SCN: NEGATIVE
TROPONIN T SERPL-MCNC: <0.01 NG/ML (ref 0–0.03)
TROPONIN T SERPL-MCNC: <0.01 NG/ML (ref 0–0.03)
TSH SERPL DL<=0.05 MIU/L-ACNC: 1.37 UIU/ML (ref 0.27–4.2)
WBC NRBC COR # BLD: 6.99 10*3/MM3 (ref 3.4–10.8)
WHOLE BLOOD HOLD COAG: NORMAL
WHOLE BLOOD HOLD SPECIMEN: NORMAL

## 2022-08-02 PROCEDURE — 80050 GENERAL HEALTH PANEL: CPT

## 2022-08-02 PROCEDURE — 87636 SARSCOV2 & INF A&B AMP PRB: CPT | Performed by: STUDENT IN AN ORGANIZED HEALTH CARE EDUCATION/TRAINING PROGRAM

## 2022-08-02 PROCEDURE — 93005 ELECTROCARDIOGRAM TRACING: CPT

## 2022-08-02 PROCEDURE — 93010 ELECTROCARDIOGRAM REPORT: CPT | Performed by: INTERNAL MEDICINE

## 2022-08-02 PROCEDURE — 84484 ASSAY OF TROPONIN QUANT: CPT | Performed by: STUDENT IN AN ORGANIZED HEALTH CARE EDUCATION/TRAINING PROGRAM

## 2022-08-02 PROCEDURE — 36415 COLL VENOUS BLD VENIPUNCTURE: CPT

## 2022-08-02 PROCEDURE — 84484 ASSAY OF TROPONIN QUANT: CPT

## 2022-08-02 PROCEDURE — 93005 ELECTROCARDIOGRAM TRACING: CPT | Performed by: STUDENT IN AN ORGANIZED HEALTH CARE EDUCATION/TRAINING PROGRAM

## 2022-08-02 PROCEDURE — 83735 ASSAY OF MAGNESIUM: CPT | Performed by: STUDENT IN AN ORGANIZED HEALTH CARE EDUCATION/TRAINING PROGRAM

## 2022-08-02 PROCEDURE — 84439 ASSAY OF FREE THYROXINE: CPT | Performed by: STUDENT IN AN ORGANIZED HEALTH CARE EDUCATION/TRAINING PROGRAM

## 2022-08-02 PROCEDURE — 71045 X-RAY EXAM CHEST 1 VIEW: CPT

## 2022-08-02 PROCEDURE — 99283 EMERGENCY DEPT VISIT LOW MDM: CPT

## 2022-08-02 PROCEDURE — 80306 DRUG TEST PRSMV INSTRMNT: CPT | Performed by: STUDENT IN AN ORGANIZED HEALTH CARE EDUCATION/TRAINING PROGRAM

## 2022-08-02 RX ORDER — SODIUM CHLORIDE 0.9 % (FLUSH) 0.9 %
10 SYRINGE (ML) INJECTION AS NEEDED
Status: DISCONTINUED | OUTPATIENT
Start: 2022-08-02 | End: 2022-08-02 | Stop reason: HOSPADM

## 2022-08-03 NOTE — ED PROVIDER NOTES
Subjective   42-year-old female comes to the ER chief complaint of a fast heart rate, chest pain, shortness of breath.  For the last few weeks the patient has been having daily episodes of a fast heart rate and feeling lightheaded.  Her other symptoms started recently.  They were worse today.  Patient did wear Holter monitor and is awaiting the results.  Her apple watch tells her her heart rate will get up to the 160s and 170s.  It last for about 30 minutes and then gets better on itself and the patient symptoms resolved.      History provided by:  Patient   used: No        Review of Systems   Constitutional: Negative for chills and fever.   HENT: Negative for drooling.    Eyes: Negative for redness.   Respiratory: Positive for shortness of breath. Negative for cough, chest tightness and wheezing.    Cardiovascular: Positive for chest pain and palpitations.   Gastrointestinal: Negative for abdominal pain, nausea and vomiting.   Genitourinary: Negative for flank pain.   Skin: Negative for color change.   Neurological: Positive for light-headedness. Negative for dizziness, seizures and syncope.   Psychiatric/Behavioral: Negative for confusion.       Past Medical History:   Diagnosis Date   • Allergies    • Asthma    • Endometriosis        Allergies   Allergen Reactions   • Cat Hair Extract Unknown - Low Severity   • Grass Provider Review Needed       Past Surgical History:   Procedure Laterality Date   • BREAST AUGMENTATION     • DILATATION AND CURETTAGE     • ENDOMETRIAL ABLATION      x 2   • ENDOSCOPY     • HYSTERECTOMY  12/07/2015    [facility] w/ bilateral salpingectomy due to Adenomyosis.    • SALPINGECTOMY Bilateral        Family History   Problem Relation Age of Onset   • Heart disease Father    • Lung cancer Mother    • No Known Problems Brother    • Heart disease Paternal Grandmother    • Heart disease Maternal Grandmother    • Breast cancer Neg Hx    • Ovarian cancer Neg Hx    • Colon cancer Neg  "Hx        Social History     Socioeconomic History   • Marital status:    Tobacco Use   • Smoking status: Never Smoker   • Smokeless tobacco: Never Used   Substance and Sexual Activity   • Alcohol use: Yes   • Drug use: No   • Sexual activity: Yes     Birth control/protection: Surgical           Objective    Vitals:    08/02/22 1538 08/02/22 1932   BP: (!) 160/105 145/71   BP Location: Right arm Left arm   Patient Position: Sitting Lying   Pulse: 87    Resp: 18 18   Temp: 97.9 °F (36.6 °C)    TempSrc: Oral    SpO2: 99%    Weight: 67.3 kg (148 lb 4.8 oz)    Height: 167.6 cm (66\")        Physical Exam  Vitals and nursing note reviewed.   Constitutional:       General: She is not in acute distress.     Appearance: She is well-developed. She is not ill-appearing, toxic-appearing or diaphoretic.   HENT:      Head: Normocephalic.      Right Ear: External ear normal.      Left Ear: External ear normal.   Cardiovascular:      Rate and Rhythm: Normal rate and regular rhythm.   Pulmonary:      Effort: Pulmonary effort is normal. No accessory muscle usage or respiratory distress.      Breath sounds: No wheezing.   Chest:      Chest wall: No tenderness.   Abdominal:      General: Bowel sounds are normal.      Palpations: Abdomen is soft.      Tenderness: There is no abdominal tenderness (deep palpation).   Musculoskeletal:      Right lower leg: No edema.      Left lower leg: No edema.   Skin:     General: Skin is warm and dry.      Capillary Refill: Capillary refill takes less than 2 seconds.   Neurological:      Mental Status: She is alert and oriented to person, place, and time.   Psychiatric:         Behavior: Behavior normal.         ECG 12 Lead      Date/Time: 8/2/2022 3:35 PM  Performed by: Martínez Mirza MD  Authorized by: Martínez Mirza MD   Interpreted by physician  Rhythm: sinus rhythm  Rate: normal  BPM: 75  QRS axis: normal  ST segment elevation noted on lead: none.  Clinical impression: normal " ECG                 ED Course      Results for orders placed or performed during the hospital encounter of 08/02/22   COVID-19 and FLU A/B PCR - Swab, Nasopharynx    Specimen: Nasopharynx; Swab   Result Value Ref Range    COVID19 Not Detected Not Detected - Ref. Range    Influenza A PCR Not Detected Not Detected    Influenza B PCR Not Detected Not Detected   Troponin    Specimen: Blood   Result Value Ref Range    Troponin T <0.010 0.000 - 0.030 ng/mL   Troponin    Specimen: Blood   Result Value Ref Range    Troponin T <0.010 0.000 - 0.030 ng/mL   Comprehensive Metabolic Panel    Specimen: Blood   Result Value Ref Range    Glucose 93 65 - 99 mg/dL    BUN 14 6 - 20 mg/dL    Creatinine 0.75 0.57 - 1.00 mg/dL    Sodium 140 136 - 145 mmol/L    Potassium 3.7 3.5 - 5.2 mmol/L    Chloride 104 98 - 107 mmol/L    CO2 26.0 22.0 - 29.0 mmol/L    Calcium 9.1 8.6 - 10.5 mg/dL    Total Protein 7.3 6.0 - 8.5 g/dL    Albumin 4.40 3.50 - 5.20 g/dL    ALT (SGPT) 14 1 - 33 U/L    AST (SGOT) 20 1 - 32 U/L    Alkaline Phosphatase 65 39 - 117 U/L    Total Bilirubin 0.3 0.0 - 1.2 mg/dL    Globulin 2.9 gm/dL    A/G Ratio 1.5 g/dL    BUN/Creatinine Ratio 18.7 7.0 - 25.0    Anion Gap 10.0 5.0 - 15.0 mmol/L    eGFR 102.1 >60.0 mL/min/1.73   CBC Auto Differential    Specimen: Blood   Result Value Ref Range    WBC 6.99 3.40 - 10.80 10*3/mm3    RBC 3.77 3.77 - 5.28 10*6/mm3    Hemoglobin 12.6 12.0 - 15.9 g/dL    Hematocrit 35.7 34.0 - 46.6 %    MCV 94.7 79.0 - 97.0 fL    MCH 33.4 (H) 26.6 - 33.0 pg    MCHC 35.3 31.5 - 35.7 g/dL    RDW 11.9 (L) 12.3 - 15.4 %    RDW-SD 40.8 37.0 - 54.0 fl    MPV 9.3 6.0 - 12.0 fL    Platelets 265 140 - 450 10*3/mm3    Neutrophil % 65.9 42.7 - 76.0 %    Lymphocyte % 22.2 19.6 - 45.3 %    Monocyte % 9.3 5.0 - 12.0 %    Eosinophil % 1.9 0.3 - 6.2 %    Basophil % 0.4 0.0 - 1.5 %    Immature Grans % 0.3 0.0 - 0.5 %    Neutrophils, Absolute 4.61 1.70 - 7.00 10*3/mm3    Lymphocytes, Absolute 1.55 0.70 - 3.10 10*3/mm3     Monocytes, Absolute 0.65 0.10 - 0.90 10*3/mm3    Eosinophils, Absolute 0.13 0.00 - 0.40 10*3/mm3    Basophils, Absolute 0.03 0.00 - 0.20 10*3/mm3    Immature Grans, Absolute 0.02 0.00 - 0.05 10*3/mm3    nRBC 0.0 0.0 - 0.2 /100 WBC   TSH+Free T4    Specimen: Blood   Result Value Ref Range    TSH 1.370 0.270 - 4.200 uIU/mL    Free T4 1.25 0.93 - 1.70 ng/dL   Magnesium    Specimen: Blood   Result Value Ref Range    Magnesium 2.5 1.6 - 2.6 mg/dL   Urine Drug Screen - Urine, Clean Catch    Specimen: Urine, Clean Catch   Result Value Ref Range    THC, Screen, Urine Negative Negative    Phencyclidine (PCP), Urine Negative Negative    Cocaine Screen, Urine Negative Negative    Methamphetamine, Ur Negative Negative    Opiate Screen Negative Negative    Amphetamine Screen, Urine Negative Negative    Benzodiazepine Screen, Urine Negative Negative    Tricyclic Antidepressants Screen Negative Negative    Methadone Screen, Urine Negative Negative    Barbiturates Screen, Urine Negative Negative    Oxycodone Screen, Urine Negative Negative    Propoxyphene Screen Negative Negative    Buprenorphine, Screen, Urine Negative Negative   ECG 12 Lead   Result Value Ref Range    QT Interval 390 ms    QTC Interval 435 ms   Green Top (Gel)   Result Value Ref Range    Extra Tube Hold for add-ons.    Lavender Top   Result Value Ref Range    Extra Tube hold for add-on    Gold Top - SST   Result Value Ref Range    Extra Tube Hold for add-ons.    Light Blue Top   Result Value Ref Range    Extra Tube Hold for add-ons.      XR Chest 1 View   Final Result      NO ACUTE CARDIOPULMONARY FINDINGS.      Electronically signed by:  Ba Ferrer  8/2/2022 8:07 PM CDT   Workstation: [id number]                [facility]  Number of Diagnoses or Management Options  Palpitations: new and requires workup  Diagnosis management comments: Vital signs are stable, afebrile.  Labs are unremarkable.  Chest x-ray shows no acute cardiopulmonary process.  EKG is normal sinus.   Patient is asymptomatic.  Recommend follow-up with her PCP and cardiology.  She already sees an electrophysiologist.  Patient states understanding and is agreeable to the plan      Final diagnoses:   Palpitations       ED Disposition  ED Disposition     ED Disposition   Discharge    Condition   Stable    Comment   --             Nickie Younger MD  [address] [facility] [address] [phone]    Schedule an appointment as soon as possible for a visit in 2 days  ER follow up    Hilario Nicolas MD  [address] [place] [address] [address]  [address]  [facility] [address] [phone]    Schedule an appointment as soon as possible for a visit in 2 days  ER follow up         Medication List      No changes were made to your prescriptions during this visit.          Martínez Mirza MD  08/02/22 2036

## 2022-08-09 LAB
QT INTERVAL: 390 MS
QTC INTERVAL: 435 MS

## 2022-08-10 DIAGNOSIS — R00.2 PALPITATIONS: Primary | ICD-10-CM

## 2022-08-31 RX ORDER — METOPROLOL SUCCINATE 25 MG/1
25 TABLET, EXTENDED RELEASE ORAL DAILY
Qty: 90 TABLET | Refills: 3 | Status: SHIPPED | OUTPATIENT
Start: 2022-08-31 | End: 2022-09-30

## 2022-09-02 ENCOUNTER — OFFICE VISIT (OUTPATIENT)
Dept: CARDIOLOGY | Facility: CLINIC | Age: 42
End: 2022-09-02

## 2022-09-02 VITALS
DIASTOLIC BLOOD PRESSURE: 72 MMHG | HEART RATE: 72 BPM | BODY MASS INDEX: 23.29 KG/M2 | OXYGEN SATURATION: 99 % | SYSTOLIC BLOOD PRESSURE: 116 MMHG | WEIGHT: 144.9 LBS | HEIGHT: 66 IN

## 2022-09-02 DIAGNOSIS — I10 ESSENTIAL HYPERTENSION: ICD-10-CM

## 2022-09-02 DIAGNOSIS — R42 DIZZINESS: ICD-10-CM

## 2022-09-02 DIAGNOSIS — R00.0 TACHYCARDIA: Primary | ICD-10-CM

## 2022-09-02 PROCEDURE — 99214 OFFICE O/P EST MOD 30 MIN: CPT | Performed by: INTERNAL MEDICINE

## 2022-09-02 NOTE — PROGRESS NOTES
Bree Garrett  42 y.o. female    9/2/2022     1. Tachycardia    2. Essential hypertension    3. Dizziness        History of Present Illness:    42 years old patient presented today dated 9/2/2022 for routine follow-up with concurrent medical problem of tachycardia mechanism identified, hypertension and dizziness may be postural in etiology.  Had previous CT coronary angiogram no evidence of CAD and echo preserved left ventricle systolic function.  She had a event monitor finding discussed with the patient.  Have intermittent sinus tachycardia the possibility of atrial tachycardia with a focus close to posterior spillage of atrium cannot be excluded with a maximum heart 170 bpm with no significant bradyarrhythmia or pause noted.  Finding discussed with the patient.  The patient was recently started on Toprol-XL starting dose of 25 mg.          SUBJECTIVE:    Allergies   Allergen Reactions   • Cat Hair Extract Unknown - Low Severity   • Grass Provider Review Needed         Past Medical History:   Diagnosis Date   • Allergies    • Asthma    • Endometriosis          Past Surgical History:   Procedure Laterality Date   • BREAST AUGMENTATION     • DILATATION AND CURETTAGE     • ENDOMETRIAL ABLATION      x 2   • ENDOSCOPY     • HYSTERECTOMY  12/07/2015    [facility] w/ bilateral salpingectomy due to Adenomyosis.    • SALPINGECTOMY Bilateral          Family History   Problem Relation Age of Onset   • Heart disease Father    • Lung cancer Mother    • No Known Problems Brother    • Heart disease Paternal Grandmother    • Heart disease Maternal Grandmother    • Breast cancer Neg Hx    • Ovarian cancer Neg Hx    • Colon cancer Neg Hx          Social History     Socioeconomic History   • Marital status:    Tobacco Use   • Smoking status: Never Smoker   • Smokeless tobacco: Never Used   Substance and Sexual Activity   • Alcohol use: Yes   • Drug use: No   • Sexual activity: Yes     Birth control/protection: Surgical  "        Current Outpatient Medications   Medication Sig Dispense Refill   • albuterol sulfate  (90 Base) MCG/ACT inhaler Inhale 2 puffs Every 4 (Four) Hours As Needed for Wheezing.     • cetirizine (zyrTEC) 10 MG tablet Take 10 mg by mouth Every Evening.  1   • lisinopril (PRINIVIL,ZESTRIL) 10 MG tablet Take 1 tablet by mouth Daily.     • metoprolol succinate XL (TOPROL-XL) 25 MG 24 hr tablet Take 1 tablet by mouth Daily. 90 tablet 3     No current facility-administered medications for this visit.           Review of Systems:     Today dated 9/2/2022 no significant changes noted in the review of system compared to previous  Constitutional:  Denies recent weight loss, weight gain,no change in exercise tolerance.     HENT:  Denies any hearing loss, epistaxis    Eyes: No blurring    Respiratory: Seasonal allergy    Cardiovascular: See H&P    Gastrointestinal:  Denies change in bowel habits and dyspepsia    Endocrine: Negative for cold intolerance, heat intolerance, polydipsia    Genitourinary: Negative.      Musculoskeletal: History of osteoarthritis    Skin:  Deniesrashes, or skin lesions.     Allergic/Immunologic: Negative.  No history of urticaria    Neurological:  Denies any history of recurrent headaches, strokes,     Hematological: Denies any food allergies, seasonal allergies    Psychiatric/Behavioral: Denies any history of depression        OBJECTIVE:    /72 (BP Location: Left arm, Patient Position: Sitting, Cuff Size: Adult)   Pulse 72   Ht 167.6 cm (66\")   Wt 65.7 kg (144 lb 14.4 oz)   SpO2 99%   BMI 23.39 kg/m²     Physical Exam:     Today dated 9/2/2022 no significant change was noted in physical exam compared to previous  Constitutional: Cooperative, alert and oriented, well-developed, well-nourished, in no acute distress.     HENT:   Head: Normocephalic, conjunctive is a pink, thyroid is nonpalpable no carotid bruit and trachea central.     Cardiovascular: Regular rhythm, S1 and S2 " normal, no S3 or S4. Apical impulse not displaced. No murmurs    Pulmonary/Chest: Chest: No chest wall tenderness no rales and wheezing    Abdominal: Abdomen soft, bowel sounds normoactive, no masses,    Musculoskeletal: No deformities, clubbing, cyanosis, erythema. positive mild edema  Neurological: No gross motor or sensory deficits noted    Skin: Warm and dry to the touch, no apparent skin lesions .     Psychiatric: He has a normal mood and affect. His behavior is normal        Procedures      Lab Results   Component Value Date    WBC 6.99 08/02/2022    HGB 12.6 08/02/2022    HCT 35.7 08/02/2022    MCV 94.7 08/02/2022     08/02/2022     Lab Results   Component Value Date    GLUCOSE 93 08/02/2022    BUN 14 08/02/2022    CREATININE 0.75 08/02/2022    EGFRIFNONA 101 10/09/2019    EGFRIFAFRI 123 10/09/2019    BCR 18.7 08/02/2022    CO2 26.0 08/02/2022    CALCIUM 9.1 08/02/2022    PROTENTOTREF 7.3 10/09/2019    ALBUMIN 4.40 08/02/2022    LABIL2 1.9 10/09/2019    AST 20 08/02/2022    ALT 14 08/02/2022     No results found for: CHOL  Lab Results   Component Value Date    TRIG 79 10/09/2019     Lab Results   Component Value Date    HDL 71 (H) 10/09/2019     No components found for: LDLCALC  Lab Results   Component Value Date    LDL 92 10/09/2019     No results found for: HDLLDLRATIO  No components found for: CHOLHDL  Lab Results   Component Value Date    HGBA1C 5.10 10/09/2019     Lab Results   Component Value Date    TSH 1.370 08/02/2022    THYROIDAB 16 04/03/2014           ASSESSMENT AND PLAN:    #1 tachycardia    The mechanism and undefined.  No recent monitor finding discussed with patient the possibility of atrial tachycardia with a focus close to posterior superior region cannot be excluded.  Multiple different options discussed with the patient.  Option #1 metoprolol just started about a day ago dose can be adjusted or antiarrhythmic medication can be contemplated in case of recurrent palpitation  tachycardia or proceed with ep study and ablation.  pros and cons of each option discussed with the patient.  she will let us know if she decided to proceed with invasive evaluation or adjustment or initiations of phentermine medication which was discussed extents with the patient's.    Postural dizziness    I have advised the patient increase of water intake with a salt palatable to taste    Hypertension decent blood pressure stable continue lisinopril.    I spent 25 minutes caring for Bree on this date of service. This time includes time spent by me of counseling/coordination of care as relates to the presenting problem and any ordered procedures/tests as outlined above.             This document has been electronically signed by Hilario Nicolas MD on September 2, 2022 13:50 CDT          Diagnoses and all orders for this visit:    1. Tachycardia (Primary)    2. Essential hypertension    3. Dizziness          Hilario Nicolas MD  9/2/2022  08:44 CDT

## 2022-09-13 ENCOUNTER — PREP FOR SURGERY (OUTPATIENT)
Dept: OTHER | Facility: HOSPITAL | Age: 42
End: 2022-09-13

## 2022-09-13 ENCOUNTER — TELEPHONE (OUTPATIENT)
Dept: CARDIOLOGY | Facility: CLINIC | Age: 42
End: 2022-09-13

## 2022-09-13 DIAGNOSIS — I47.1 ATRIAL TACHYCARDIA: Primary | ICD-10-CM

## 2022-09-13 PROBLEM — I47.19 ATRIAL TACHYCARDIA: Status: ACTIVE | Noted: 2022-09-13

## 2022-09-13 RX ORDER — SODIUM CHLORIDE 0.9 % (FLUSH) 0.9 %
3 SYRINGE (ML) INJECTION EVERY 12 HOURS SCHEDULED
Status: CANCELLED | OUTPATIENT
Start: 2022-10-03

## 2022-09-13 RX ORDER — SODIUM CHLORIDE 0.9 % (FLUSH) 0.9 %
10 SYRINGE (ML) INJECTION AS NEEDED
Status: CANCELLED | OUTPATIENT
Start: 2022-10-03

## 2022-09-13 NOTE — TELEPHONE ENCOUNTER
Patient has been scheduled for cardiac procedure of: EP Study/ Atrial Tachycardia Ablation on date of 09/22/2022 the following preprocedure instructions have been discussed with patient:      -You will register at same-day surgery the day of your procedure.  -Same-day surgery will call day before procedure to let you know of arrival time.  -Be sure to shower the night before or morning of procedure.  Avoid heavy lotions or perfumes.  -Nothing to eat or drink after midnight the night before procedure.  -You may take your morning medications unless directed otherwise.   Hold metoprolol 3 days before procedure.     -Please bring a  with you  -If you are not feeling well or have a fever the day before your procedure please call the office, as we may need to postpone procedure.  -Please call our office at [phone] with any questions or concerns.    She was understanding.

## 2022-09-20 LAB
QT INTERVAL: 390 MS
QTC INTERVAL: 429 MS

## 2022-09-21 ENCOUNTER — TELEPHONE (OUTPATIENT)
Dept: CARDIOLOGY | Facility: CLINIC | Age: 42
End: 2022-09-21

## 2022-09-21 NOTE — TELEPHONE ENCOUNTER
Contacted patient to inform her we would have to reschedule her procedure to 10/3/2022 due to the EP machine being down. She voiced her understanding.

## 2022-09-30 RX ORDER — METOPROLOL SUCCINATE 25 MG/1
25 TABLET, EXTENDED RELEASE ORAL NIGHTLY
Status: ON HOLD | COMMUNITY
End: 2022-10-03

## 2022-10-02 ENCOUNTER — ANESTHESIA EVENT (OUTPATIENT)
Dept: CARDIOLOGY | Facility: HOSPITAL | Age: 42
End: 2022-10-02

## 2022-10-03 ENCOUNTER — ANESTHESIA (OUTPATIENT)
Dept: CARDIOLOGY | Facility: HOSPITAL | Age: 42
End: 2022-10-03

## 2022-10-03 ENCOUNTER — HOSPITAL ENCOUNTER (OUTPATIENT)
Facility: HOSPITAL | Age: 42
Discharge: HOME | End: 2022-10-04
Attending: INTERNAL MEDICINE | Admitting: INTERNAL MEDICINE

## 2022-10-03 DIAGNOSIS — I47.1 ATRIAL TACHYCARDIA: ICD-10-CM

## 2022-10-03 LAB
ANION GAP SERPL CALCULATED.3IONS-SCNC: 8 MMOL/L (ref 5–15)
BUN SERPL-MCNC: 11 MG/DL (ref 6–20)
BUN/CREAT SERPL: 15.5 (ref 7–25)
CALCIUM SPEC-SCNC: 9 MG/DL (ref 8.6–10.5)
CHLORIDE SERPL-SCNC: 105 MMOL/L (ref 98–107)
CO2 SERPL-SCNC: 28 MMOL/L (ref 22–29)
CREAT SERPL-MCNC: 0.71 MG/DL (ref 0.57–1)
DEPRECATED RDW RBC AUTO: 39.8 FL (ref 37–54)
EGFRCR SERPLBLD CKD-EPI 2021: 109 ML/MIN/1.73
ERYTHROCYTE [DISTWIDTH] IN BLOOD BY AUTOMATED COUNT: 11.3 % (ref 12.3–15.4)
GLUCOSE SERPL-MCNC: 90 MG/DL (ref 65–99)
HCT VFR BLD AUTO: 37.5 % (ref 34–46.6)
HGB BLD-MCNC: 12.8 G/DL (ref 12–15.9)
INR PPP: 1.05 (ref 0.8–1.2)
MCH RBC QN AUTO: 32.7 PG (ref 26.6–33)
MCHC RBC AUTO-ENTMCNC: 34.1 G/DL (ref 31.5–35.7)
MCV RBC AUTO: 95.9 FL (ref 79–97)
PLATELET # BLD AUTO: 254 10*3/MM3 (ref 140–450)
PMV BLD AUTO: 8.9 FL (ref 6–12)
POTASSIUM SERPL-SCNC: 4.2 MMOL/L (ref 3.5–5.2)
PROTHROMBIN TIME: 13.6 SECONDS (ref 11.1–15.3)
RBC # BLD AUTO: 3.91 10*6/MM3 (ref 3.77–5.28)
SODIUM SERPL-SCNC: 141 MMOL/L (ref 136–145)
WBC NRBC COR # BLD: 3.3 10*3/MM3 (ref 3.4–10.8)

## 2022-10-03 PROCEDURE — 85610 PROTHROMBIN TIME: CPT | Performed by: INTERNAL MEDICINE

## 2022-10-03 PROCEDURE — 93623 PRGRMD STIMJ&PACG IV RX NFS: CPT | Performed by: INTERNAL MEDICINE

## 2022-10-03 PROCEDURE — 80048 BASIC METABOLIC PNL TOTAL CA: CPT | Performed by: INTERNAL MEDICINE

## 2022-10-03 PROCEDURE — 25010000002 ENOXAPARIN PER 10 MG: Performed by: INTERNAL MEDICINE

## 2022-10-03 PROCEDURE — 25010000002 DIPHENHYDRAMINE PER 50 MG: Performed by: INTERNAL MEDICINE

## 2022-10-03 PROCEDURE — 25010000002 MIDAZOLAM PER 1 MG: Performed by: NURSE ANESTHETIST, CERTIFIED REGISTERED

## 2022-10-03 PROCEDURE — S0260 H&P FOR SURGERY: HCPCS | Performed by: INTERNAL MEDICINE

## 2022-10-03 PROCEDURE — 93621 COMP EP EVL L PAC&REC C SINS: CPT | Performed by: INTERNAL MEDICINE

## 2022-10-03 PROCEDURE — C1894 INTRO/SHEATH, NON-LASER: HCPCS | Performed by: INTERNAL MEDICINE

## 2022-10-03 PROCEDURE — 93620 COMP EP EVL R AT VEN PAC&REC: CPT | Performed by: INTERNAL MEDICINE

## 2022-10-03 PROCEDURE — 25010000002 EPINEPHRINE 5 MG/250 ML: Performed by: INTERNAL MEDICINE

## 2022-10-03 PROCEDURE — 25010000002 PROPOFOL 10 MG/ML EMULSION: Performed by: NURSE ANESTHETIST, CERTIFIED REGISTERED

## 2022-10-03 PROCEDURE — 25010000002 PROCAINAMIDE PER 1 G: Performed by: INTERNAL MEDICINE

## 2022-10-03 PROCEDURE — C1730 CATH, EP, 19 OR FEW ELECT: HCPCS | Performed by: INTERNAL MEDICINE

## 2022-10-03 PROCEDURE — C1731 CATH, EP, 20 OR MORE ELEC: HCPCS | Performed by: INTERNAL MEDICINE

## 2022-10-03 PROCEDURE — 25010000002 FENTANYL CITRATE (PF) 50 MCG/ML SOLUTION: Performed by: NURSE ANESTHETIST, CERTIFIED REGISTERED

## 2022-10-03 PROCEDURE — 85027 COMPLETE CBC AUTOMATED: CPT | Performed by: INTERNAL MEDICINE

## 2022-10-03 PROCEDURE — 25010000002 HEPARIN (PORCINE) PER 1000 UNITS: Performed by: INTERNAL MEDICINE

## 2022-10-03 PROCEDURE — 25010000002 ATROPINE PER 0.01 MG: Performed by: INTERNAL MEDICINE

## 2022-10-03 RX ORDER — SODIUM CHLORIDE, SODIUM GLUCONATE, SODIUM ACETATE, POTASSIUM CHLORIDE AND MAGNESIUM CHLORIDE 526; 502; 368; 37; 30 MG/100ML; MG/100ML; MG/100ML; MG/100ML; MG/100ML
1000 INJECTION, SOLUTION INTRAVENOUS CONTINUOUS PRN
Status: DISCONTINUED | OUTPATIENT
Start: 2022-10-03 | End: 2022-10-04 | Stop reason: HOSPADM

## 2022-10-03 RX ORDER — DILTIAZEM HYDROCHLORIDE 120 MG/1
120 CAPSULE, COATED, EXTENDED RELEASE ORAL
Status: DISCONTINUED | OUTPATIENT
Start: 2022-10-03 | End: 2022-10-04

## 2022-10-03 RX ORDER — FENTANYL CITRATE 50 UG/ML
INJECTION, SOLUTION INTRAMUSCULAR; INTRAVENOUS AS NEEDED
Status: DISCONTINUED | OUTPATIENT
Start: 2022-10-03 | End: 2022-10-03 | Stop reason: SURG

## 2022-10-03 RX ORDER — ATROPINE SULFATE 1 MG/ML
INJECTION, SOLUTION INTRAMUSCULAR; INTRAVENOUS; SUBCUTANEOUS AS NEEDED
Status: DISCONTINUED | OUTPATIENT
Start: 2022-10-03 | End: 2022-10-04 | Stop reason: HOSPADM

## 2022-10-03 RX ORDER — PROCAINAMIDE HYDROCHLORIDE 500 MG/ML
INJECTION INTRAMUSCULAR; INTRAVENOUS AS NEEDED
Status: DISCONTINUED | OUTPATIENT
Start: 2022-10-03 | End: 2022-10-04 | Stop reason: HOSPADM

## 2022-10-03 RX ORDER — PROPOFOL 10 MG/ML
VIAL (ML) INTRAVENOUS AS NEEDED
Status: DISCONTINUED | OUTPATIENT
Start: 2022-10-03 | End: 2022-10-03 | Stop reason: SURG

## 2022-10-03 RX ORDER — METOPROLOL SUCCINATE 25 MG/1
25 TABLET, EXTENDED RELEASE ORAL DAILY
COMMUNITY
End: 2022-10-04 | Stop reason: HOSPADM

## 2022-10-03 RX ORDER — ENOXAPARIN SODIUM 100 MG/ML
40 INJECTION SUBCUTANEOUS EVERY 24 HOURS
Status: DISCONTINUED | OUTPATIENT
Start: 2022-10-03 | End: 2022-10-04 | Stop reason: HOSPADM

## 2022-10-03 RX ORDER — ONDANSETRON 2 MG/ML
4 INJECTION INTRAMUSCULAR; INTRAVENOUS EVERY 6 HOURS PRN
Status: DISCONTINUED | OUTPATIENT
Start: 2022-10-03 | End: 2022-10-04 | Stop reason: HOSPADM

## 2022-10-03 RX ORDER — SCOLOPAMINE TRANSDERMAL SYSTEM 1 MG/1
1 PATCH, EXTENDED RELEASE TRANSDERMAL ONCE
Status: DISCONTINUED | OUTPATIENT
Start: 2022-10-03 | End: 2022-10-03

## 2022-10-03 RX ORDER — SODIUM CHLORIDE 0.9 % (FLUSH) 0.9 %
10 SYRINGE (ML) INJECTION AS NEEDED
Status: DISCONTINUED | OUTPATIENT
Start: 2022-10-03 | End: 2022-10-03 | Stop reason: HOSPADM

## 2022-10-03 RX ORDER — FLECAINIDE ACETATE 50 MG/1
50 TABLET ORAL EVERY 12 HOURS SCHEDULED
Status: DISCONTINUED | OUTPATIENT
Start: 2022-10-03 | End: 2022-10-04 | Stop reason: HOSPADM

## 2022-10-03 RX ORDER — MIDAZOLAM HYDROCHLORIDE 1 MG/ML
INJECTION INTRAMUSCULAR; INTRAVENOUS AS NEEDED
Status: DISCONTINUED | OUTPATIENT
Start: 2022-10-03 | End: 2022-10-03 | Stop reason: SURG

## 2022-10-03 RX ORDER — HEPARIN SODIUM 1000 [USP'U]/ML
INJECTION, SOLUTION INTRAVENOUS; SUBCUTANEOUS AS NEEDED
Status: DISCONTINUED | OUTPATIENT
Start: 2022-10-03 | End: 2022-10-04 | Stop reason: HOSPADM

## 2022-10-03 RX ORDER — ONDANSETRON 2 MG/ML
4 INJECTION INTRAMUSCULAR; INTRAVENOUS ONCE AS NEEDED
Status: DISCONTINUED | OUTPATIENT
Start: 2022-10-03 | End: 2022-10-03 | Stop reason: HOSPADM

## 2022-10-03 RX ORDER — SODIUM CHLORIDE 0.9 % (FLUSH) 0.9 %
3 SYRINGE (ML) INJECTION EVERY 12 HOURS SCHEDULED
Status: DISCONTINUED | OUTPATIENT
Start: 2022-10-03 | End: 2022-10-03 | Stop reason: HOSPADM

## 2022-10-03 RX ORDER — DIPHENHYDRAMINE HYDROCHLORIDE 50 MG/ML
INJECTION INTRAMUSCULAR; INTRAVENOUS AS NEEDED
Status: DISCONTINUED | OUTPATIENT
Start: 2022-10-03 | End: 2022-10-04 | Stop reason: HOSPADM

## 2022-10-03 RX ORDER — HYDROCODONE BITARTRATE AND ACETAMINOPHEN 5; 325 MG/1; MG/1
1 TABLET ORAL EVERY 4 HOURS PRN
Status: DISCONTINUED | OUTPATIENT
Start: 2022-10-03 | End: 2022-10-04 | Stop reason: HOSPADM

## 2022-10-03 RX ADMIN — PROPOFOL 150 MG: 10 INJECTION, EMULSION INTRAVENOUS at 13:28

## 2022-10-03 RX ADMIN — SCOLOPAMINE TRANSDERMAL SYSTEM 1 PATCH: 1 PATCH, EXTENDED RELEASE TRANSDERMAL at 11:20

## 2022-10-03 RX ADMIN — FENTANYL CITRATE 25 MCG: 50 INJECTION INTRAMUSCULAR; INTRAVENOUS at 14:18

## 2022-10-03 RX ADMIN — MIDAZOLAM HYDROCHLORIDE 2 MG: 1 INJECTION, SOLUTION INTRAMUSCULAR; INTRAVENOUS at 13:01

## 2022-10-03 RX ADMIN — ENOXAPARIN SODIUM 40 MG: 40 INJECTION SUBCUTANEOUS at 21:31

## 2022-10-03 RX ADMIN — Medication 0.1 MCG/KG/MIN: at 14:09

## 2022-10-03 RX ADMIN — DILTIAZEM HYDROCHLORIDE 120 MG: 120 CAPSULE, COATED, EXTENDED RELEASE ORAL at 17:56

## 2022-10-03 RX ADMIN — PROPOFOL 50 MG: 10 INJECTION, EMULSION INTRAVENOUS at 13:08

## 2022-10-03 RX ADMIN — FENTANYL CITRATE 25 MCG: 50 INJECTION INTRAMUSCULAR; INTRAVENOUS at 14:03

## 2022-10-03 RX ADMIN — FLECAINIDE ACETATE 50 MG: 50 TABLET ORAL at 21:30

## 2022-10-03 RX ADMIN — FENTANYL CITRATE 25 MCG: 50 INJECTION INTRAMUSCULAR; INTRAVENOUS at 13:32

## 2022-10-03 RX ADMIN — SODIUM CHLORIDE, SODIUM GLUCONATE, SODIUM ACETATE, POTASSIUM CHLORIDE AND MAGNESIUM CHLORIDE 1000 ML: 526; 502; 368; 37; 30 INJECTION, SOLUTION INTRAVENOUS at 15:32

## 2022-10-03 RX ADMIN — SODIUM CHLORIDE, SODIUM GLUCONATE, SODIUM ACETATE, POTASSIUM CHLORIDE AND MAGNESIUM CHLORIDE 1000 ML: 526; 502; 368; 37; 30 INJECTION, SOLUTION INTRAVENOUS at 10:53

## 2022-10-03 RX ADMIN — FENTANYL CITRATE 25 MCG: 50 INJECTION INTRAMUSCULAR; INTRAVENOUS at 13:51

## 2022-10-03 NOTE — ANESTHESIA PROCEDURE NOTES
Airway  Urgency: elective    Date/Time: 10/3/2022 1:09 PM  End Time:10/3/2022 1:09 PM  Airway not difficult    General Information and Staff    Patient location during procedure: radiology    Indications and Patient Condition  Indications for airway management: airway protection    Preoxygenated: yes  MILS maintained throughout  Mask difficulty assessment: 0 - not attempted    Final Airway Details  Final airway type: supraglottic airway      Successful airway: I-gel  Size 4    Number of attempts at approach: 1  Assessment: lips, teeth, and gum same as pre-op

## 2022-10-03 NOTE — PLAN OF CARE
Goal Outcome Evaluation:  Plan of Care Reviewed With: patient        Progress: no change       No apparent complications related to EP study and ablation. Gauze over right groin site has a small spot of dried blood. No fresh bleeding since Pt was transferred to [facility]. Pt denies having pain. Vital signs stable.

## 2022-10-03 NOTE — H&P
Cardiology at [facility] History and Physical Note      Bree Garrett  Pool/CATH  [id number]  1980    DATE OF ADMISSION: 10/3/2022    Reason for Hospitalization: Supraventricular/atrial tachycardia    History of Present Illness:  42 years old patient presented today dated 9/2/2022 for routine follow-up with concurrent medical problem of tachycardia mechanism identified, hypertension and dizziness may be postural in etiology.  Had previous CT coronary angiogram no evidence of CAD and echo preserved left ventricle systolic function.  She had a event monitor t.  Have intermittent sinus tachycardia the possibility of atrial tachycardia with a focus close to posterior superior of atrium cannot be excluded with a maximum heart 170 bpm with no significant bradyarrhythmia or pause noted.    The patient was recently started on Toprol-XL starting dose of 25 mg  Allergies   Allergen Reactions   • Cat Hair Extract Cough   • Grass Cough       Prior to Admission medications    Medication Sig Start Date End Date Taking? Authorizing Provider   cetirizine (zyrTEC) 10 MG tablet Take 10 mg by mouth Every Evening. 8/8/19  Yes Robin Lawson MD   lisinopril (PRINIVIL,ZESTRIL) 10 MG tablet Take 1 tablet by mouth Every Night.   Yes ProviderRobin MD   albuterol sulfate  (90 Base) MCG/ACT inhaler Inhale 2 puffs Every 4 (Four) Hours As Needed for Wheezing or Shortness of Air.    ProviderRobin MD   metoprolol succinate XL (TOPROL-XL) 25 MG 24 hr tablet Take 25 mg by mouth Every Night.    ProviderRobin MD       Past Medical History:   Diagnosis Date   • Allergies    • Asthma    • Endometriosis    • Hypertension    • PONV (postoperative nausea and vomiting)    • SVT (supraventricular tachycardia) ([facility])        Past Surgical History:   Procedure Laterality Date   • BREAST AUGMENTATION     • DILATATION AND CURETTAGE     • ENDOMETRIAL ABLATION      x 2   • ENDOSCOPY     • HYSTERECTOMY   "12/07/2015    [facility] w/ bilateral salpingectomy due to Adenomyosis.        Social History     Socioeconomic History   • Marital status:    Tobacco Use   • Smoking status: Never Smoker   • Smokeless tobacco: Never Used   Vaping Use   • Vaping Use: Never used   Substance and Sexual Activity   • Alcohol use: Yes     Comment: occasional   • Drug use: No   • Sexual activity: Yes     Partners: Male     Birth control/protection: Surgical     Comment:        Family History   Problem Relation Age of Onset   • Heart disease Father    • Lung cancer Mother    • No Known Problems Brother    • Heart disease Paternal Grandmother    • Heart disease Maternal Grandmother    • Breast cancer Neg Hx    • Ovarian cancer Neg Hx    • Colon cancer Neg Hx        Patient Active Problem List   Diagnosis   • Dyspareunia   • Endometriosis   • Postcoital bleeding   • Bradycardia, sinus   • Essential hypertension   • Dizziness   • Tachycardia   • Atrial tachycardia (HCC)        REVIEW OF SYSTEMS:   Review of Systems    12 point ROS was performed and is negative except as outlined in HPI.       Objective:     Vitals:    09/30/22 1532 10/03/22 1032   BP:  152/80   BP Location:  Right arm   Patient Position:  Sitting   Pulse:  75   Resp:  18   Temp:  97.9 °F (36.6 °C)   TempSrc:  Temporal   SpO2:  100%   Weight: 64.9 kg (143 lb) 64.4 kg (141 lb 15.6 oz)   Height: 167.6 cm (66\") 167.6 cm (66\")     Body mass index is 22.92 kg/m².  Flowsheet Rows    Flowsheet Row First Filed Value   Admission Height 167.6 cm (66\") Documented at 09/30/2022 1532   Admission Weight 64.9 kg (143 lb) Documented at 09/30/2022 1532        No intake or output data in the 24 hours ending 10/03/22 1253      Physical Exam:  Constitutional: Oriented to person, place, and time.  Well-developed and well-nourished. No distress.   HENT: Normocephalic.   Eyes: Conjunctivae are normal. No scleral icterus.   Neck: Normal carotid pulses, no hepatojugular reflux and no JVD " present. Carotid bruit is not present. No tracheal deviation, no edema and no erythema present. No thyromegaly present.   Cardiovascular: Normal rate, regular rhythm, S1 normal, S2 normal, normal heart sounds and intact distal pulses.   No extrasystoles are present.   Pulmonary/Chest: Effort normal and breath sounds normal. No respiratory distress.   Abdominal: Soft. Bowel sounds are normal. Exhibits no distension and no mass.   Musculoskeletal:  Exhibits no edema, no tenderness.   Neurological: Alert and awake.  Skin: Warm and dry.  Psychiatric: Normal mood and affect.      Lab Review:                Results from last 7 days   Lab Units 10/03/22  1052   SODIUM mmol/L 141   POTASSIUM mmol/L 4.2   CHLORIDE mmol/L 105   CO2 mmol/L 28.0   BUN mg/dL 11   CREATININE mg/dL 0.71   GLUCOSE mg/dL 90     Results from last 7 days   Lab Units 10/03/22  1052   WBC 10*3/mm3 3.30*   HEMOGLOBIN g/dL 12.8   HEMATOCRIT % 37.5   PLATELETS 10*3/mm3 254     Results from last 7 days   Lab Units 10/03/22  1052   INR  1.05                     I personally viewed and interpreted the patient's EKG/Telemetry data.      Assessment/Plan:        Supraventricular/atrial tachycardia     42 years old patient evaluated in the office had documented atrial/supraventricular tachycardia with undefined mechanism.  She is on metoprolol in spite of that continuing breakthrough with a heart rate up to 160 to 170 bpm.  Echo preserved left ventricle systolic function.  Multiple different options during office evaluation discussed with the patient.  She opted for EP study and ablation.  She had history of hysterectomy.  Procedure risk pros and cons of EP study ablation dependent outcome discussed with the patient.  Risk included but not limited to infection, bleeding, stroke, cardiac perforation, pneumothorax, heart block, he hematoma.  Understand willing to proceed forward.            This document has been electronically signed by Hilario Nicolas MD on  October 3, 2022 12:55 CDT      Thank you for allowing me to participate in the care of Bree Garrett. Feel free to contact me directly with any further questions or concerns.    Hilario Nicolas MD  10/03/22  12:53 CDT.    Part of this note may be an electronic transcription/translation of spoken language to printed text using the Dragon Dictation system.

## 2022-10-03 NOTE — ANESTHESIA POSTPROCEDURE EVALUATION
Patient: Bree Garrett    Procedure Summary     Date: 10/03/22 Room / Location: [facility] CATH/EP LAB 3 / [facility] CATH INVASIVE LOCATION    Anesthesia Start: 1256 Anesthesia Stop: 1521    Procedure: EP/Atrial tachycardia Ablation (N/A ) Diagnosis:       Atrial tachycardia (HCC)      (Atrial tachycardia)    Providers: Hilario Nicolas MD Provider: Silvino Do MD    Anesthesia Type: general ASA Status: 3          Anesthesia Type: general    Vitals  Vitals Value Taken Time   /77 10/03/22 1515   Temp 98.4 °F (36.9 °C) 10/03/22 1515   Pulse 89 10/03/22 1515   Resp 18 10/03/22 1515   SpO2 99 % 10/03/22 1515           Post Anesthesia Care and Evaluation    Patient location during evaluation: PACU  Patient participation: complete - patient participated  Level of consciousness: awake and alert  Pain score: 0  Pain management: adequate    Airway patency: patent  Anesthetic complications: No anesthetic complications  PONV Status: none  Cardiovascular status: acceptable  Respiratory status: acceptable  Hydration status: acceptable    Comments: 99% on RA, 96  140/77  98.4

## 2022-10-03 NOTE — ANESTHESIA PREPROCEDURE EVALUATION
Anesthesia Evaluation     history of anesthetic complications: PONV  NPO Solid Status: > 8 hours  NPO Liquid Status: > 2 hours           Airway   Mallampati: III  TM distance: >3 FB  Neck ROM: full  Possible difficult intubation  Dental - normal exam     Pulmonary - normal exam    breath sounds clear to auscultation  (+) asthma,  (-) sleep apnea, not a smoker    ROS comment: Seasonal allergies  Cardiovascular   Exercise tolerance: good (4-7 METS)    ECG reviewed  PT is on anticoagulation therapy  Patient on routine beta blocker  Rhythm: regular  Rate: normal    (+) hypertension well controlled 2 medications or greater, dysrhythmias Tachycardia, angina (with tachycardia),   (-) valvular problems/murmurs, past MI, murmur, cardiac stents, DVT, hyperlipidemia    ROS comment: Rhythm: sinus rhythm   Rate: normal   BPM: 75   QRS axis: normal   ST segment elevation noted on lead: none.   Clinical impression: normal ECG   Interpreted by Martínez Mirza        Neuro/Psych  (-) seizures, TIA, CVA, headaches, weakness, numbness, psychiatric history  GI/Hepatic/Renal/Endo    (-) GERD, hepatitis, liver disease, no renal disease, diabetes, no thyroid disorder    Musculoskeletal (-) negative ROS    Abdominal    Substance History   (+) alcohol use (occ),   (-) drug use     OB/GYN    (-)  Pregnant        Other        (-) history of cancer  ROS/Med Hx Other: Stopped metoprolol per cardiology                  Anesthesia Plan    ASA 3     general     (Scop patch ordered)  intravenous induction     Anesthetic plan, risks, benefits, and alternatives have been provided, discussed and informed consent has been obtained with: patient and spouse/significant other.        CODE STATUS:

## 2022-10-04 VITALS
TEMPERATURE: 98.1 F | OXYGEN SATURATION: 99 % | WEIGHT: 141.98 LBS | RESPIRATION RATE: 18 BRPM | HEIGHT: 66 IN | SYSTOLIC BLOOD PRESSURE: 125 MMHG | DIASTOLIC BLOOD PRESSURE: 68 MMHG | HEART RATE: 69 BPM | BODY MASS INDEX: 22.82 KG/M2

## 2022-10-04 PROCEDURE — 93005 ELECTROCARDIOGRAM TRACING: CPT | Performed by: INTERNAL MEDICINE

## 2022-10-04 PROCEDURE — 99217 PR OBSERVATION CARE DISCHARGE MANAGEMENT: CPT | Performed by: INTERNAL MEDICINE

## 2022-10-04 PROCEDURE — 93010 ELECTROCARDIOGRAM REPORT: CPT | Performed by: INTERNAL MEDICINE

## 2022-10-04 RX ORDER — FLECAINIDE ACETATE 50 MG/1
50 TABLET ORAL EVERY 12 HOURS SCHEDULED
Qty: 60 TABLET | Refills: 5 | Status: SHIPPED | OUTPATIENT
Start: 2022-10-04

## 2022-10-04 RX ADMIN — FLECAINIDE ACETATE 50 MG: 50 TABLET ORAL at 10:52

## 2022-10-04 NOTE — DISCHARGE SUMMARY
DISCHARGE SUMMARY      Date of Discharge:  10/4/2022    Discharge Diagnosis:   1. Atrial tachycardia (HCC)        Presenting Problem/History of Present Illness:  Atrial tachycardia (HCC) [I47.1]     Hospital Course:  Patient is a 42 y.o. female presented with history of tachycardia undefined mechanism documented when on monitor.  Underwent EP study nonsustained arrhythmia was induced not enough to map.  And isolate the target.  No ablation was performed.  During cath placement PFO was noted.  Patient was started on flecainide tolerating well no further tachycardia noted on monitor    Procedures Performed:  Procedure(s):  EP/Atrial tachycardia Ablation       Consults:   Consults     No orders found for last 30 day(s).           Lab Results:      Condition on Discharge: Stable      ROS  Patient denies orthopnea PND chest pain dizziness palpitation fluttering fever cough or  Vital Signs:  Temp:  [97.2 °F (36.2 °C)-98.4 °F (36.9 °C)] 98.1 °F (36.7 °C)  Heart Rate:  [50-89] 68  Resp:  [16-18] 18  BP: (105-150)/(51-83) 116/69  FiO2 (%):  [50 %] 50 %    Physical Exam:  Physical Exam  Lungs are clear to auscultation    Cardiovascular regular rate rhythm no S3 no pericardial rub    Abdomen soft    Extreme positive peripheral pulses no edema    Logical no gross focal abnormality  Discharge Disposition:  Home or Self Care    Discharge Medications:     Discharge Medications      New Medications      Instructions Start Date   flecainide 50 MG tablet  Commonly known as: TAMBOCOR   50 mg, Oral, Every 12 Hours Scheduled         Continue These Medications      Instructions Start Date   albuterol sulfate  (90 Base) MCG/ACT inhaler  Commonly known as: PROVENTIL HFA;VENTOLIN HFA;PROAIR HFA   2 puffs, Inhalation, Every 4 Hours PRN      cetirizine 10 MG tablet  Commonly known as: zyrTEC   10 mg, Oral, Every Evening      lisinopril 10 MG tablet  Commonly known as: PRINIVIL,ZESTRIL   1 tablet, Oral, Nightly         Stop These  Medications    metoprolol succinate XL 25 MG 24 hr tablet  Commonly known as: TOPROL-XL            Discharge Instructions: Do not lift weight or drive for 24 hours.    Discharge Diet: Cardiac    Discharge Condition: Stable    Activity at Discharge: As tolerated    Follow-up Appointments:  Future Appointments   Date Time Provider Department Center   11/2/2022  9:30 AM Hilario Nicolas MD [facility] None         Hilario Nicolas MD  10/04/22  12:39 CDT

## 2022-10-05 LAB
QT INTERVAL: 394 MS
QTC INTERVAL: 431 MS

## 2023-11-29 ENCOUNTER — OFFICE VISIT (OUTPATIENT)
Dept: OBSTETRICS AND GYNECOLOGY | Facility: CLINIC | Age: 43
End: 2023-11-29
Payer: COMMERCIAL

## 2023-11-29 VITALS
SYSTOLIC BLOOD PRESSURE: 128 MMHG | HEIGHT: 66 IN | DIASTOLIC BLOOD PRESSURE: 86 MMHG | BODY MASS INDEX: 22.82 KG/M2 | WEIGHT: 142 LBS

## 2023-11-29 DIAGNOSIS — Z78.9 NON-SMOKER: ICD-10-CM

## 2023-11-29 DIAGNOSIS — Z01.419 WELL WOMAN EXAM WITH ROUTINE GYNECOLOGICAL EXAM: Primary | ICD-10-CM

## 2023-11-29 DIAGNOSIS — Z12.31 SCREENING MAMMOGRAM FOR BREAST CANCER: ICD-10-CM

## 2023-11-29 PROCEDURE — 99386 PREV VISIT NEW AGE 40-64: CPT | Performed by: OBSTETRICS & GYNECOLOGY

## 2023-11-29 RX ORDER — IVABRADINE 5 MG/1
2.5 TABLET, FILM COATED ORAL 2 TIMES DAILY
COMMUNITY

## 2023-11-29 NOTE — PROGRESS NOTES
"Chief Complaint  Gynecologic Exam (Pt here as returning new gyn for annual and denies any problems, last pap 08/19/2015 normal and denies any hx abnormal, s/p hysterectomy due to adenomyosis in 2015 done by you, last mammogram 02/21/2020)    Subjective        Bree Garrett presents to [facility]  History of Present Illness    Patient presents today for yearly exam  Self breast exam, diet, exercise, multivitamin use all discussed  All of her questions were answered to her liking  ASCCP guidelines were reviewed for Pap smear surveillance  She is not due for her Pap smear    She is status post hysterectomy for benign disease  Her Pap smear history is normal        Objective   Vital Signs:  /86 (BP Location: Left arm, Patient Position: Sitting, Cuff Size: Large Adult)   Ht 167.6 cm (66\")   Wt 64.4 kg (142 lb)   BMI 22.92 kg/m²   Estimated body mass index is 22.92 kg/m² as calculated from the following:    Height as of this encounter: 167.6 cm (66\").    Weight as of this encounter: 64.4 kg (142 lb).       BMI is within normal parameters. No other follow-up for BMI required.      Physical Exam  Vitals and nursing note reviewed. Exam conducted with a chaperone present.   Constitutional:       Appearance: Normal appearance.   HENT:      Head: Normocephalic and atraumatic.      Mouth/Throat:      Mouth: Mucous membranes are moist.   Eyes:      Extraocular Movements: Extraocular movements intact.      Pupils: Pupils are equal, round, and reactive to light.   Neck:      Vascular: No carotid bruit.   Cardiovascular:      Rate and Rhythm: Normal rate and regular rhythm.      Pulses: Normal pulses.      Heart sounds: Normal heart sounds. No murmur heard.     No friction rub. No gallop.   Pulmonary:      Effort: Pulmonary effort is normal. No respiratory distress.      Breath sounds: Normal breath sounds. No stridor. No wheezing, rhonchi or rales.   Chest:      Chest wall: No tenderness. "   Breasts:     Breasts are symmetrical.      Right: Normal. No swelling, bleeding, inverted nipple, mass, nipple discharge, skin change or tenderness.      Left: Normal. No swelling, bleeding, inverted nipple, mass, nipple discharge, skin change or tenderness.   Abdominal:      General: Abdomen is flat. Bowel sounds are normal. There is no distension.      Palpations: Abdomen is soft. There is no mass.      Tenderness: There is no abdominal tenderness. There is no right CVA tenderness, left CVA tenderness, guarding or rebound.      Hernia: No hernia is present. There is no hernia in the left inguinal area or right inguinal area.   Genitourinary:     General: Normal vulva.      Exam position: Lithotomy position.      Pubic Area: No rash or pubic lice.       Labia:         Right: No rash, tenderness, lesion or injury.         Left: No rash, tenderness, lesion or injury.       Urethra: No prolapse, urethral pain, urethral swelling or urethral lesion.      Vagina: Normal.      Uterus: Absent.       Adnexa: Right adnexa normal and left adnexa normal.        Right: No mass, tenderness or fullness.          Left: No mass, tenderness or fullness.           Musculoskeletal:         General: Normal range of motion.      Cervical back: Normal range of motion and neck supple. No rigidity. No muscular tenderness.   Lymphadenopathy:      Cervical: No cervical adenopathy.      Upper Body:      Right upper body: No supraclavicular, axillary or pectoral adenopathy.      Left upper body: No supraclavicular, axillary or pectoral adenopathy.      Lower Body: No right inguinal adenopathy. No left inguinal adenopathy.   Skin:     General: Skin is warm and dry.   Neurological:      General: No focal deficit present.      Mental Status: She is alert and oriented to person, place, and time. Mental status is at baseline.   Psychiatric:         Mood and Affect: Mood normal.         Behavior: Behavior normal.         Thought Content: Thought  content normal.         Judgment: Judgment normal.        Result Review :                   Assessment and Plan   Diagnoses and all orders for this visit:    1. Well woman exam with routine gynecological exam (Primary)    2. Screening mammogram for breast cancer  -     Mammo Screening Digital Tomosynthesis Bilateral With CAD    3. Non-smoker             Follow Up   Return in about 1 year (around 11/29/2024) for Annual physical.  Patient was given instructions and counseling regarding her condition or for health maintenance advice. Please see specific information pulled into the AVS if appropriate.     Call for concerns or questions    Efren Calero MD

## 2023-12-17 LAB
NCCN CRITERIA FLAG: NORMAL
TYRER CUZICK SCORE: 15.9

## 2023-12-26 ENCOUNTER — HOSPITAL ENCOUNTER (OUTPATIENT)
Dept: MAMMOGRAPHY | Facility: HOSPITAL | Age: 43
Discharge: HOME | End: 2023-12-26
Admitting: OBSTETRICS & GYNECOLOGY
Payer: COMMERCIAL

## 2023-12-26 PROCEDURE — 77067 SCR MAMMO BI INCL CAD: CPT

## 2023-12-26 PROCEDURE — 77063 BREAST TOMOSYNTHESIS BI: CPT

## (undated) DEVICE — KT INTRO MINISTICK MAX W/GW NITNL/TUNG ECHO 4F 21G 7CM

## (undated) DEVICE — KT ELECTRD ENSITE PRECISION SURF

## (undated) DEVICE — INTRO SHEATH ULTIMUM ACT 5F

## (undated) DEVICE — INTRO SHEATH ENGAGE W/50 GW .038 8F12

## (undated) DEVICE — PK CATH LAB 60

## (undated) DEVICE — ST CVR PROB PULLUP ULTRASND 5X48IN

## (undated) DEVICE — CATH EP SUPREME QUADPOLAR JSN 4F 5-5-5M 120CM

## (undated) DEVICE — CATH DUO DECAPLR 7F SUPR LG CURL

## (undated) DEVICE — ELECTRODE,RT,STRESS,FOAM,50PK: Brand: MEDLINE

## (undated) DEVICE — TBG ABL COOL PT 2.6M 100042049